# Patient Record
Sex: FEMALE
[De-identification: names, ages, dates, MRNs, and addresses within clinical notes are randomized per-mention and may not be internally consistent; named-entity substitution may affect disease eponyms.]

---

## 2015-09-21 RX ORDER — FLUOROURACIL 2 G/40G
CREAM TOPICAL
Qty: 40 | Refills: 0
Start: 2015-09-21

## 2021-04-14 ENCOUNTER — RX ONLY (RX ONLY)
Age: 66
End: 2021-04-14

## 2021-04-14 ENCOUNTER — FOLLOW-UP (OUTPATIENT)
Dept: URBAN - METROPOLITAN AREA CLINIC 20 | Facility: CLINIC | Age: 66
Setting detail: DERMATOLOGY
End: 2021-04-14

## 2021-04-14 DIAGNOSIS — D23.62 OTHER BENIGN NEOPLASM OF SKIN OF LEFT UPPER LIMB, INCLUDING SHOULDER: ICD-10-CM

## 2021-04-14 DIAGNOSIS — L57.8 OTHER SKIN CHANGES DUE TO CHRONIC EXPOSURE TO NONIONIZING RADIATION: ICD-10-CM

## 2021-04-14 DIAGNOSIS — D22.5 MELANOCYTIC NEVI OF TRUNK: ICD-10-CM

## 2021-04-14 DIAGNOSIS — D18.01 HEMANGIOMA OF SKIN AND SUBCUTANEOUS TISSUE: ICD-10-CM

## 2021-04-14 DIAGNOSIS — L82.1 OTHER SEBORRHEIC KERATOSIS: ICD-10-CM

## 2021-04-14 DIAGNOSIS — L81.4 OTHER MELANIN HYPERPIGMENTATION: ICD-10-CM

## 2021-04-14 PROBLEM — L21.8 OTHER SEBORRHEIC DERMATITIS: Status: RESOLVED | Noted: 2021-04-14

## 2021-04-14 PROCEDURE — 99213 OFFICE O/P EST LOW 20 MIN: CPT

## 2021-04-14 RX ORDER — KETOCONAZOLE 20 MG/G
A SMALL AMOUNT CREAM TOPICAL TWICE A DAY
Qty: 45 | Refills: 1
Start: 2021-04-14

## 2021-11-03 ENCOUNTER — OTHER (OUTPATIENT)
Dept: URBAN - METROPOLITAN AREA CLINIC 20 | Facility: CLINIC | Age: 66
Setting detail: DERMATOLOGY
End: 2021-11-03

## 2021-11-03 DIAGNOSIS — L82.0 INFLAMED SEBORRHEIC KERATOSIS: ICD-10-CM

## 2021-11-03 PROBLEM — L57.0 ACTINIC KERATOSIS: Status: RESOLVED | Noted: 2021-11-03

## 2021-11-03 PROCEDURE — 17000 DESTRUCT PREMALG LESION: CPT

## 2021-11-03 PROCEDURE — 17110 DESTRUCT B9 LESION 1-14: CPT

## 2022-01-11 RX ORDER — PREDNISONE 20 MG/1
1 TABLET TABLET ORAL ONCE A DAY
Qty: 15 | Refills: 0
Start: 2022-01-11

## 2022-04-13 ENCOUNTER — OTHER (OUTPATIENT)
Dept: URBAN - METROPOLITAN AREA CLINIC 100 | Facility: CLINIC | Age: 67
Setting detail: DERMATOLOGY
End: 2022-04-13

## 2022-04-13 DIAGNOSIS — L64.9 ANDROGENIC ALOPECIA, UNSPECIFIED: ICD-10-CM

## 2022-04-13 PROBLEM — L30.9 DERMATITIS, UNSPECIFIED: Status: RESOLVED | Noted: 2022-04-13

## 2022-04-13 PROCEDURE — 96372 THER/PROPH/DIAG INJ SC/IM: CPT

## 2022-04-13 PROCEDURE — 17110 DESTRUCT B9 LESION 1-14: CPT

## 2022-11-02 ENCOUNTER — APPOINTMENT (OUTPATIENT)
Dept: URBAN - METROPOLITAN AREA CLINIC 192 | Age: 67
Setting detail: DERMATOLOGY
End: 2022-11-03

## 2022-11-02 DIAGNOSIS — L30.1 DYSHIDROSIS [POMPHOLYX]: ICD-10-CM

## 2022-11-02 DIAGNOSIS — D485 NEOPLASM OF UNCERTAIN BEHAVIOR OF SKIN: ICD-10-CM

## 2022-11-02 DIAGNOSIS — L29.2 PRURITUS VULVAE: ICD-10-CM

## 2022-11-02 PROBLEM — D48.5 NEOPLASM OF UNCERTAIN BEHAVIOR OF SKIN: Status: ACTIVE | Noted: 2022-11-02

## 2022-11-02 PROCEDURE — 99212 OFFICE O/P EST SF 10 MIN: CPT | Mod: 25

## 2022-11-02 PROCEDURE — 96372 THER/PROPH/DIAG INJ SC/IM: CPT | Mod: 59

## 2022-11-02 PROCEDURE — OTHER BIOPSY BY SHAVE METHOD: OTHER

## 2022-11-02 PROCEDURE — OTHER COUNSELING: OTHER

## 2022-11-02 PROCEDURE — 11102 TANGNTL BX SKIN SINGLE LES: CPT

## 2022-11-02 PROCEDURE — OTHER MIPS QUALITY: OTHER

## 2022-11-02 PROCEDURE — OTHER PRESCRIPTION: OTHER

## 2022-11-02 PROCEDURE — OTHER INTRAMUSCULAR KENALOG: OTHER

## 2022-11-02 RX ORDER — HYDROCORTISONE 25 MG/G
CREAM TOPICAL
Qty: 30 | Refills: 3 | Status: ERX | COMMUNITY
Start: 2022-11-02

## 2022-11-02 ASSESSMENT — LOCATION SIMPLE DESCRIPTION DERM
LOCATION SIMPLE: LABIA MAJORA
LOCATION SIMPLE: RIGHT KNEE
LOCATION SIMPLE: RIGHT BUTTOCK

## 2022-11-02 ASSESSMENT — LOCATION ZONE DERM
LOCATION ZONE: LEG
LOCATION ZONE: VULVA
LOCATION ZONE: TRUNK

## 2022-11-02 ASSESSMENT — LOCATION DETAILED DESCRIPTION DERM
LOCATION DETAILED: LEFT LABIUM MAJUS
LOCATION DETAILED: RIGHT KNEE
LOCATION DETAILED: RIGHT BUTTOCK

## 2022-12-07 ENCOUNTER — APPOINTMENT (OUTPATIENT)
Dept: URBAN - METROPOLITAN AREA CLINIC 192 | Age: 67
Setting detail: DERMATOLOGY
End: 2022-12-10

## 2022-12-07 PROBLEM — C44.722 SQUAMOUS CELL CARCINOMA OF SKIN OF RIGHT LOWER LIMB, INCLUDING HIP: Status: ACTIVE | Noted: 2022-12-07

## 2022-12-07 PROCEDURE — OTHER PUNCH EXCISION: OTHER

## 2022-12-07 PROCEDURE — OTHER COUNSELING: OTHER

## 2022-12-07 PROCEDURE — 11600 EXC TR-EXT MAL+MARG 0.5 CM/<: CPT

## 2022-12-19 ENCOUNTER — APPOINTMENT (OUTPATIENT)
Dept: URBAN - METROPOLITAN AREA CLINIC 192 | Age: 67
Setting detail: DERMATOLOGY
End: 2022-12-25

## 2022-12-19 DIAGNOSIS — Z48.02 ENCOUNTER FOR REMOVAL OF SUTURES: ICD-10-CM

## 2022-12-19 PROCEDURE — 99024 POSTOP FOLLOW-UP VISIT: CPT

## 2022-12-19 PROCEDURE — OTHER COUNSELING: OTHER

## 2022-12-19 PROCEDURE — OTHER SUTURE REMOVAL (GLOBAL PERIOD): OTHER

## 2022-12-19 ASSESSMENT — LOCATION DETAILED DESCRIPTION DERM: LOCATION DETAILED: RIGHT ANTERIOR DISTAL THIGH

## 2022-12-19 ASSESSMENT — LOCATION SIMPLE DESCRIPTION DERM: LOCATION SIMPLE: RIGHT THIGH

## 2022-12-19 ASSESSMENT — LOCATION ZONE DERM: LOCATION ZONE: LEG

## 2022-12-19 NOTE — PROCEDURE: SUTURE REMOVAL (GLOBAL PERIOD)
Detail Level: Detailed
Add 74611 Cpt? (Important Note: In 2017 The Use Of 78163 Is Being Tracked By Cms To Determine Future Global Period Reimbursement For Global Periods): no

## 2023-01-25 ENCOUNTER — APPOINTMENT (OUTPATIENT)
Dept: URBAN - METROPOLITAN AREA CLINIC 192 | Age: 68
Setting detail: DERMATOLOGY
End: 2023-01-28

## 2023-01-25 DIAGNOSIS — L82.0 INFLAMED SEBORRHEIC KERATOSIS: ICD-10-CM

## 2023-01-25 PROCEDURE — OTHER COUNSELING: OTHER

## 2023-01-25 PROCEDURE — 17110 DESTRUCT B9 LESION 1-14: CPT

## 2023-01-25 PROCEDURE — OTHER BENIGN DESTRUCTION: OTHER

## 2023-01-25 ASSESSMENT — LOCATION ZONE DERM
LOCATION ZONE: TRUNK
LOCATION ZONE: LEG

## 2023-01-25 ASSESSMENT — LOCATION SIMPLE DESCRIPTION DERM
LOCATION SIMPLE: RIGHT BACK
LOCATION SIMPLE: LEFT POSTERIOR THIGH

## 2023-01-25 ASSESSMENT — LOCATION DETAILED DESCRIPTION DERM
LOCATION DETAILED: LEFT DISTAL POSTERIOR THIGH
LOCATION DETAILED: RIGHT SUPERIOR LATERAL UPPER BACK

## 2023-01-25 NOTE — PROCEDURE: BENIGN DESTRUCTION
Render Note In Bullet Format When Appropriate: No
Treatment Number (Will Not Render If 0): 1
Anesthesia Type: 1% lidocaine with 1:100,000 epinephrine and a 1:10 solution of 8.4% sodium bicarbonate
Medical Necessity Information: It is in your best interest to select a reason for this procedure from the list below. All of these items fulfill various CMS LCD requirements except the new and changing color options.
Anesthesia Volume In Cc: 0.5
Medical Necessity Clause: This procedure was medically necessary because the lesions that were treated were:
Duration Of Freeze Thaw-Cycle (Seconds): 15
Post-Care Instructions: I reviewed with the patient in detail post-care instructions. Patient is to wear sunprotection, and avoid picking at any of the treated lesions. Pt may apply Vaseline to crusted or scabbing areas.
Detail Level: Simple
Consent: The patient's consent was obtained including but not limited to risks of crusting, scabbing, blistering, scarring, darker or lighter pigmentary change, recurrence, incomplete removal and infection.
Number Of Freeze-Thaw Cycles: 2 freeze-thaw cycles

## 2023-05-24 ENCOUNTER — APPOINTMENT (OUTPATIENT)
Dept: URBAN - METROPOLITAN AREA CLINIC 192 | Age: 68
Setting detail: DERMATOLOGY
End: 2023-05-27

## 2023-05-24 DIAGNOSIS — Z85.828 PERSONAL HISTORY OF OTHER MALIGNANT NEOPLASM OF SKIN: ICD-10-CM

## 2023-05-24 DIAGNOSIS — L30.1 DYSHIDROSIS [POMPHOLYX]: ICD-10-CM

## 2023-05-24 DIAGNOSIS — L82.1 OTHER SEBORRHEIC KERATOSIS: ICD-10-CM

## 2023-05-24 PROCEDURE — 96372 THER/PROPH/DIAG INJ SC/IM: CPT

## 2023-05-24 PROCEDURE — OTHER COUNSELING: OTHER

## 2023-05-24 PROCEDURE — 99212 OFFICE O/P EST SF 10 MIN: CPT | Mod: 25

## 2023-05-24 PROCEDURE — OTHER MIPS QUALITY: OTHER

## 2023-05-24 PROCEDURE — OTHER INTRAMUSCULAR KENALOG: OTHER

## 2023-05-24 PROCEDURE — OTHER OBSERVATION: OTHER

## 2023-05-24 PROCEDURE — OTHER PRESCRIPTION: OTHER

## 2023-05-24 RX ORDER — CLOBETASOL PROPIONATE 0.25 MG/G
CREAM TOPICAL
Qty: 60 | Refills: 6 | Status: ERX | COMMUNITY
Start: 2023-05-24

## 2023-05-24 ASSESSMENT — LOCATION SIMPLE DESCRIPTION DERM
LOCATION SIMPLE: LEFT HAND
LOCATION SIMPLE: LEFT UPPER BACK
LOCATION SIMPLE: RIGHT KNEE
LOCATION SIMPLE: LEFT ELBOW
LOCATION SIMPLE: RIGHT PRETIBIAL REGION
LOCATION SIMPLE: RIGHT HAND
LOCATION SIMPLE: RIGHT ELBOW
LOCATION SIMPLE: RIGHT BUTTOCK

## 2023-05-24 ASSESSMENT — LOCATION DETAILED DESCRIPTION DERM
LOCATION DETAILED: RIGHT ELBOW
LOCATION DETAILED: RIGHT DISTAL PRETIBIAL REGION
LOCATION DETAILED: LEFT RADIAL PALM
LOCATION DETAILED: LEFT ELBOW
LOCATION DETAILED: LEFT INFERIOR UPPER BACK
LOCATION DETAILED: RIGHT KNEE
LOCATION DETAILED: RIGHT BUTTOCK
LOCATION DETAILED: RIGHT ULNAR PALM

## 2023-05-24 ASSESSMENT — LOCATION ZONE DERM
LOCATION ZONE: LEG
LOCATION ZONE: HAND
LOCATION ZONE: ARM
LOCATION ZONE: TRUNK

## 2023-05-24 ASSESSMENT — SEVERITY ASSESSMENT: SEVERITY: MODERATE

## 2023-05-24 NOTE — PROCEDURE: MIPS QUALITY
Quality 47: Advance Care Plan: Advance care planning not documented, reason not otherwise specified.
Quality 431: Preventive Care And Screening: Unhealthy Alcohol Use - Screening: Patient not identified as an unhealthy alcohol user when screened for unhealthy alcohol use using a systematic screening method
Quality 226: Preventive Care And Screening: Tobacco Use: Screening And Cessation Intervention: Patient screened for tobacco use and is an ex/non-smoker
Quality 111:Pneumonia Vaccination Status For Older Adults: Patient received any pneumococcal conjugate or polysaccharide vaccine on or after their 60th birthday and before the end of the measurement period
Quality 130: Documentation Of Current Medications In The Medical Record: Current Medications Documented
Detail Level: Detailed

## 2023-11-22 ENCOUNTER — APPOINTMENT (OUTPATIENT)
Dept: URBAN - METROPOLITAN AREA CLINIC 306 | Age: 68
Setting detail: DERMATOLOGY
End: 2023-11-25

## 2023-11-22 DIAGNOSIS — D485 NEOPLASM OF UNCERTAIN BEHAVIOR OF SKIN: ICD-10-CM

## 2023-11-22 DIAGNOSIS — D18.0 HEMANGIOMA: ICD-10-CM

## 2023-11-22 DIAGNOSIS — L82.0 INFLAMED SEBORRHEIC KERATOSIS: ICD-10-CM

## 2023-11-22 DIAGNOSIS — D22 MELANOCYTIC NEVI: ICD-10-CM

## 2023-11-22 DIAGNOSIS — L81.4 OTHER MELANIN HYPERPIGMENTATION: ICD-10-CM

## 2023-11-22 PROBLEM — D22.5 MELANOCYTIC NEVI OF TRUNK: Status: ACTIVE | Noted: 2023-11-22

## 2023-11-22 PROBLEM — D18.01 HEMANGIOMA OF SKIN AND SUBCUTANEOUS TISSUE: Status: ACTIVE | Noted: 2023-11-22

## 2023-11-22 PROBLEM — D48.5 NEOPLASM OF UNCERTAIN BEHAVIOR OF SKIN: Status: ACTIVE | Noted: 2023-11-22

## 2023-11-22 PROCEDURE — OTHER LIQUID NITROGEN: OTHER

## 2023-11-22 PROCEDURE — OTHER COUNSELING: OTHER

## 2023-11-22 PROCEDURE — 11103 TANGNTL BX SKIN EA SEP/ADDL: CPT | Mod: 59

## 2023-11-22 PROCEDURE — 99213 OFFICE O/P EST LOW 20 MIN: CPT | Mod: 25

## 2023-11-22 PROCEDURE — OTHER MIPS QUALITY: OTHER

## 2023-11-22 PROCEDURE — OTHER SUNSCREEN RECOMMENDATIONS: OTHER

## 2023-11-22 PROCEDURE — 11104 PUNCH BX SKIN SINGLE LESION: CPT

## 2023-11-22 PROCEDURE — OTHER BIOPSY BY PUNCH METHOD: OTHER

## 2023-11-22 PROCEDURE — OTHER REASSURANCE: OTHER

## 2023-11-22 PROCEDURE — 17110 DESTRUCT B9 LESION 1-14: CPT | Mod: 59

## 2023-11-22 PROCEDURE — OTHER BIOPSY BY SHAVE METHOD: OTHER

## 2023-11-22 ASSESSMENT — LOCATION DETAILED DESCRIPTION DERM
LOCATION DETAILED: LEFT ANTERIOR PROXIMAL THIGH
LOCATION DETAILED: RIGHT INFERIOR UPPER BACK
LOCATION DETAILED: LEFT DISTAL LATERAL CALF
LOCATION DETAILED: LEFT SUPERIOR UPPER BACK
LOCATION DETAILED: LEFT MID-UPPER BACK
LOCATION DETAILED: LEFT INFERIOR CENTRAL MALAR CHEEK

## 2023-11-22 ASSESSMENT — LOCATION SIMPLE DESCRIPTION DERM
LOCATION SIMPLE: LEFT THIGH
LOCATION SIMPLE: LEFT CALF
LOCATION SIMPLE: LEFT UPPER BACK
LOCATION SIMPLE: RIGHT UPPER BACK
LOCATION SIMPLE: LEFT CHEEK

## 2023-11-22 ASSESSMENT — LOCATION ZONE DERM
LOCATION ZONE: TRUNK
LOCATION ZONE: LEG
LOCATION ZONE: FACE

## 2023-11-22 NOTE — PROCEDURE: LIQUID NITROGEN
Show Aperture Variable?: Yes
Spray Paint Text: The liquid nitrogen was applied to the skin utilizing a spray paint frosting technique.
Medical Necessity Clause: This procedure was medically necessary because the lesions that were treated were:
Spray Paint Technique: No
Post-Care Instructions: I reviewed with the patient in detail post-care instructions. Patient is to wear sunprotection, and avoid picking at any of the treated lesions. Pt may apply Vaseline to crusted or scabbing areas.
Detail Level: Detailed
Consent: The patient's consent was obtained including but not limited to risks of crusting, scabbing, blistering, scarring, darker or lighter pigmentary change, recurrence, incomplete removal and infection.
Medical Necessity Information: It is in your best interest to select a reason for this procedure from the list below. All of these items fulfill various CMS LCD requirements except the new and changing color options.

## 2023-12-06 ENCOUNTER — APPOINTMENT (OUTPATIENT)
Dept: URBAN - METROPOLITAN AREA CLINIC 306 | Age: 68
Setting detail: DERMATOLOGY
End: 2023-12-10

## 2023-12-06 DIAGNOSIS — Z48.02 ENCOUNTER FOR REMOVAL OF SUTURES: ICD-10-CM

## 2023-12-06 PROCEDURE — 99024 POSTOP FOLLOW-UP VISIT: CPT

## 2023-12-06 PROCEDURE — OTHER SUTURE REMOVAL (GLOBAL PERIOD): OTHER

## 2023-12-06 ASSESSMENT — LOCATION SIMPLE DESCRIPTION DERM: LOCATION SIMPLE: LEFT THIGH

## 2023-12-06 ASSESSMENT — LOCATION DETAILED DESCRIPTION DERM: LOCATION DETAILED: LEFT ANTERIOR PROXIMAL THIGH

## 2023-12-06 ASSESSMENT — LOCATION ZONE DERM: LOCATION ZONE: LEG

## 2023-12-06 NOTE — PROCEDURE: SUTURE REMOVAL (GLOBAL PERIOD)
Detail Level: Detailed
Add 65078 Cpt? (Important Note: In 2017 The Use Of 43833 Is Being Tracked By Cms To Determine Future Global Period Reimbursement For Global Periods): yes